# Patient Record
Sex: MALE | Race: WHITE | NOT HISPANIC OR LATINO | Employment: FULL TIME | URBAN - METROPOLITAN AREA
[De-identification: names, ages, dates, MRNs, and addresses within clinical notes are randomized per-mention and may not be internally consistent; named-entity substitution may affect disease eponyms.]

---

## 2017-04-06 ENCOUNTER — TELEPHONE (OUTPATIENT)
Dept: NEUROSURGERY | Facility: CLINIC | Age: 42
End: 2017-04-06

## 2017-04-06 NOTE — TELEPHONE ENCOUNTER
Received another faxed refill request from [facility] for this patient. Patient was told in December to receive further refills from his PCP.     Called pharmacy, they said they would remove Dr. Rider from his provider/refill request list

## 2025-01-03 ENCOUNTER — OFFICE VISIT (OUTPATIENT)
Dept: UROLOGY | Facility: CLINIC | Age: 50
End: 2025-01-03
Payer: COMMERCIAL

## 2025-01-03 VITALS
BODY MASS INDEX: 34.84 KG/M2 | HEIGHT: 67 IN | HEART RATE: 101 BPM | OXYGEN SATURATION: 94 % | DIASTOLIC BLOOD PRESSURE: 84 MMHG | TEMPERATURE: 98 F | WEIGHT: 222 LBS | SYSTOLIC BLOOD PRESSURE: 126 MMHG

## 2025-01-03 DIAGNOSIS — N52.9 ERECTILE DYSFUNCTION, UNSPECIFIED ERECTILE DYSFUNCTION TYPE: Primary | ICD-10-CM

## 2025-01-03 PROBLEM — I10 HYPERTENSIVE DISORDER: Status: ACTIVE | Noted: 2020-01-17

## 2025-01-03 PROBLEM — G25.81 RESTLESS LEG: Status: ACTIVE | Noted: 2025-01-03

## 2025-01-03 PROBLEM — E78.5 HYPERLIPIDEMIA: Status: ACTIVE | Noted: 2023-08-18

## 2025-01-03 PROBLEM — E66.01 MORBID OBESITY: Status: ACTIVE | Noted: 2024-03-26

## 2025-01-03 PROBLEM — L21.9 SEBORRHEIC DERMATITIS: Status: ACTIVE | Noted: 2020-07-17

## 2025-01-03 PROBLEM — F51.01 PRIMARY INSOMNIA: Status: ACTIVE | Noted: 2022-06-08

## 2025-01-03 PROBLEM — G43.019 COMMON MIGRAINE WITH INTRACTABLE MIGRAINE: Status: ACTIVE | Noted: 2025-01-03

## 2025-01-03 PROBLEM — E78.5 DYSLIPIDEMIA: Status: ACTIVE | Noted: 2022-07-04

## 2025-01-03 PROBLEM — G47.33 OBSTRUCTIVE SLEEP APNEA OF ADULT: Status: ACTIVE | Noted: 2021-12-01

## 2025-01-03 PROBLEM — F41.9 ANXIETY: Status: ACTIVE | Noted: 2021-07-29

## 2025-01-03 PROBLEM — M54.2 CERVICAL PAIN: Status: ACTIVE | Noted: 2025-01-03

## 2025-01-03 RX ORDER — PHENTERMINE HYDROCHLORIDE 37.5 MG/1
37.5 TABLET ORAL
COMMUNITY

## 2025-01-03 RX ORDER — KETOCONAZOLE 20 MG/ML
2 SHAMPOO, SUSPENSION TOPICAL WEEKLY
COMMUNITY

## 2025-01-03 RX ORDER — HYDROCORTISONE 25 MG/G
1 CREAM TOPICAL 2 TIMES DAILY
COMMUNITY

## 2025-01-03 RX ORDER — SILDENAFIL CITRATE 20 MG/1
20 TABLET ORAL 3 TIMES DAILY
COMMUNITY
End: 2025-01-03 | Stop reason: SDUPTHER

## 2025-01-03 RX ORDER — METOPROLOL SUCCINATE 25 MG/1
25 TABLET, EXTENDED RELEASE ORAL DAILY
COMMUNITY

## 2025-01-03 RX ORDER — AMITRIPTYLINE HYDROCHLORIDE 50 MG/1
50 TABLET ORAL NIGHTLY
COMMUNITY
Start: 2024-06-06

## 2025-01-03 RX ORDER — BUSPIRONE HYDROCHLORIDE 10 MG/1
10 TABLET ORAL 3 TIMES DAILY
COMMUNITY

## 2025-01-03 RX ORDER — PRAVASTATIN SODIUM 20 MG
20 TABLET ORAL DAILY
COMMUNITY

## 2025-01-03 RX ORDER — MINOXIDIL 2.5 MG/1
2.5 TABLET ORAL DAILY
COMMUNITY

## 2025-01-03 RX ORDER — AMLODIPINE BESYLATE 10 MG/1
10 TABLET ORAL DAILY
COMMUNITY

## 2025-01-03 RX ORDER — TRAMADOL HYDROCHLORIDE 50 MG/1
1 TABLET ORAL EVERY 6 HOURS PRN
COMMUNITY

## 2025-01-03 RX ORDER — SILDENAFIL CITRATE 20 MG/1
TABLET ORAL
Qty: 30 TABLET | Refills: 11 | Status: SHIPPED | OUTPATIENT
Start: 2025-01-03

## 2025-01-03 RX ORDER — SEMAGLUTIDE 2.68 MG/ML
2 INJECTION, SOLUTION SUBCUTANEOUS WEEKLY
COMMUNITY

## 2025-01-03 NOTE — PROGRESS NOTES
Office Visit Erectile Dysfunction New     Patient Name: Dany León  : 1975   MRN: [id number]     Chief Complaint: Erectile Dysfunction.   Chief Complaint   Patient presents with    Erectile Dysfunction       Referring Provider: Cyndi Campo DO    History of Present Illness: Dany León is a 49 y.o. male who presents today with history of degenerative disc disease, obesity, hypertension, hyperlipidemia, prediabetes (last HGA1C 5.8 on Metformin) and STEFANO who presents for a general examination today and to discuss treatment for ED.  He reports that he is satisfied with his current dose of Sildenafil 60 mg up to 2-3 times per week.  DORON score without Sildenafil is 10.       He primarily has difficulty with achieving and maintaining an erection.    He has tried Sildenafil in the past with satisfactory results.    With Sildenafil, he rates his erectile rigidity as a 10 on a scale of 0-10 (with 6 being just firm enough for penetration).     He doestake the oral PDE5- on an empty stomach, 1 hour prior to intercourse and without alcohol. He does not take oral nitrates for chest pain    Occasionally has bothersome LUTS that include hesitancy and occasional frequency.  Not bothersome enough to consider treatment or workup at this time. PSA 0.539 ng/ml at last visit.      Subjective      Review of System: ROS reviewed by me and is negative unless otherwise noted in HPI.        Past Medical History:   Past Medical History:   Diagnosis Date    Chronic neck pain     Diabetes     Erectile dysfunction     Headache     Hypercholesterolemia     Hypertension     Insomnia     Male pattern baldness        Past Surgical History:   Past Surgical History:   Procedure Laterality Date    SHOULDER SURGERY Right     TONSILLECTOMY      WRIST SURGERY Left        Family History:   Family History   Problem Relation Age of Onset    Hypertension Father     Cancer Paternal Grandfather     Cancer Brother     Prostate  cancer Neg Hx      He has no family history of prostate, bladder or kidney cancer.  There is not a strong family history of cardiovascular disease.    Social History:   Social History     Socioeconomic History    Marital status:     Number of children: 2   Tobacco Use    Smoking status: Never     Passive exposure: Never    Smokeless tobacco: Never   Vaping Use    Vaping status: Never Used   Substance and Sexual Activity    Alcohol use: Yes     Alcohol/week: 1.0 standard drink of alcohol     Types: 1 Shots of liquor per week    Drug use: Never    Sexual activity: Yes     Partners: Female     Birth control/protection: None       Medications:     Current Outpatient Medications:     amitriptyline (ELAVIL) 50 MG tablet, Take 1 tablet by mouth Every Night., Disp: , Rfl:     amLODIPine (NORVASC) 10 MG tablet, Take 1 tablet by mouth Daily., Disp: , Rfl:     Aspirin-Acetaminophen-Caffeine (EXCEDRIN MIGRAINE PO), Take  by mouth., Disp: , Rfl:     busPIRone (BUSPAR) 10 MG tablet, Take 1 tablet by mouth 3 (Three) Times a Day., Disp: , Rfl:     cyclobenzaprine (FLEXERIL) 10 MG tablet, Take 1 tablet by mouth 3 (Three) Times a Day As Needed for muscle spasms., Disp: 90 tablet, Rfl: 3    hydrocortisone 2.5 % cream, Apply 1 Application topically to the appropriate area as directed 2 (Two) Times a Day., Disp: , Rfl:     ketoconazole (NIZORAL) 2 % shampoo, Apply 2 Applications topically to the appropriate area as directed 1 (One) Time Per Week., Disp: , Rfl:     metFORMIN (GLUCOPHAGE) 500 MG tablet, Take 1 tablet by mouth Daily With Breakfast., Disp: , Rfl:     metoprolol succinate XL (TOPROL-XL) 25 MG 24 hr tablet, Take 1 tablet by mouth Daily., Disp: , Rfl:     minoxidil (LONITEN) 2.5 MG tablet, Take 1 tablet by mouth Daily., Disp: , Rfl:     nabumetone (RELAFEN) 750 MG tablet, TAKE 1 TABLET BY MOUTH TWICE DAILY, Disp: 60 tablet, Rfl: 0    Ozempic, 2 MG/DOSE, 8 MG/3ML solution pen-injector, Inject 2 mg under the skin into the  "appropriate area as directed 1 (One) Time Per Week., Disp: , Rfl:     phentermine (ADIPEX-P) 37.5 MG tablet, Take 1 tablet by mouth Every Morning Before Breakfast., Disp: , Rfl:     pravastatin (PRAVACHOL) 20 MG tablet, Take 1 tablet by mouth Daily., Disp: , Rfl:     sildenafil (REVATIO) 20 MG tablet, May take 1-3 tablets in 24 hours, 1 hour prior to intercourse without alcohol, Disp: 30 tablet, Rfl: 11    traMADol (ULTRAM) 50 MG tablet, Take 1 tablet by mouth Every 6 (Six) Hours As Needed., Disp: , Rfl:     Allergies:   No Known Allergies    Objective     Physical Exam:   Vital Signs:   Vitals:    01/03/25 1456   BP: 126/84   Pulse: 101   Temp: 98 °F (36.7 °C)   SpO2: 94%   Weight: 101 kg (222 lb)   Height: 170.2 cm (67\")     Body mass index is 34.77 kg/m².   Physical Exam  Vitals and nursing note reviewed.   Constitutional:       General: He is not in acute distress.     Appearance: Normal appearance. He is obese. He is not ill-appearing.   HENT:      Head: Normocephalic and atraumatic.   Pulmonary:      Effort: Pulmonary effort is normal.   Skin:     General: Skin is warm and dry.   Neurological:      General: No focal deficit present.      Mental Status: He is alert and oriented to person, place, and time.   Psychiatric:         Mood and Affect: Mood normal.         Behavior: Behavior normal.      Labs  No results found for: \"TESTOSTERONE\", \"PROLACTIN\", \"FSH\", \"LH\", \"HCT\"    No results found for: \"PSA\"    Assessment / Plan      Assessment  Dnay León is a 49 y.o. male who presents today with history of degenerative disc disease, obesity, hypertension, hyperlipidemia, prediabetes (last HGA1C 5.8 on Metformin) and STEFANO who presents for a general examination today and to discuss treatment for ED.  He reports that he is satisfied with his current dose of Sildenafil 60 mg up to 2-3 times per week.  DORON score without Sildenafil is 10.       Plan to continue Sildenafil and follow up in 1 year.  Mr. León was " agreeable to this plan today.  Encouraged continued weight loss efforts, healthy diet with high fiber and protein. Drinking plenty of water and engaging in physical activity.      Diagnoses and all orders for this visit:    1. Erectile dysfunction, unspecified erectile dysfunction type (Primary)  -     sildenafil (REVATIO) 20 MG tablet; May take 1-3 tablets in 24 hours, 1 hour prior to intercourse without alcohol  Dispense: 30 tablet; Refill: 11    Plan  1.   I discussed treatment options including oral PDE5- medication, intra-urethral suppositories, pharmacologic injections, vacuum erection devices and implantable penile prostheses.    2. The patient was educated about Sildenafil.  He was counseled on appropriate use, timing and the need for sexual stimulation.  In addition, he understands not to use this medication with nitrates. He was instructed to take the medication about 1 hour prior to sexual activity.  Side effects were explained to the patient such as headache, visual changes, upset stomach, and back pain. Lastly, he was instructed to go immediately to his nearest emergency room in the event he developed an erection lasting longer than 3 hours. He voiced understanding of all these instructions and the importance of seeking prompt medical attention for an erection lasting longer than 3 hours.     3. We also discussed association of erectile dysfunction and future coronary events. Erectile dysfunction can be a marker for future coronary events and usually precedes by 2-4 years.  Recommended he discuss EKG and possible stress test with his PCP and following his cholesterol.    Follow Up:   Return in about 1 year (around 1/3/2026) for Next scheduled follow up, with Rachael.      KERRY Leung, MSN, FNP-C  [facility]